# Patient Record
Sex: MALE | Race: WHITE | URBAN - METROPOLITAN AREA
[De-identification: names, ages, dates, MRNs, and addresses within clinical notes are randomized per-mention and may not be internally consistent; named-entity substitution may affect disease eponyms.]

---

## 2019-05-29 ENCOUNTER — TELEPHONE (OUTPATIENT)
Dept: TRANSPLANT | Facility: CLINIC | Age: 49
End: 2019-05-29

## 2019-05-29 DIAGNOSIS — Z00.5 TRANSPLANT DONOR EVALUATION: Primary | ICD-10-CM

## 2019-05-29 NOTE — TELEPHONE ENCOUNTER
"[facility] BREEZE  [id number]      LIVING KIDNEY DONOR EVALUATION  Donor First Name Marcus Donor MRN    Donor Last Name Thuy Completed 2019 7:19 PM    1970 Record ID [id number]   BREEZE Screen PASSED     Intended Recipient  Recipient First Name Robbin Recipient MRN    Recipient Last Name Luis Daniel Relationship Close Friend   Recipient   Recipient Diagnosis    Recipient's ABO      Donor Information  Age 48 Gender Male   Ht 170 cm (5' 7'') Race    Wt 90.7 kg (200 lbs) Ethnicity Not /   BMI 31.30 kg/m  Preferred Language English      Required No     Blood Type Unknown   Demographics  Home Address [address] [place] [address] # [phone]   City [place] Type Mobile   State [place] Alternate #    [facility] Code [id number] Type    Country [place] Preferred Contact day Mon, Fri, Thur, Wed, Tue   Email [email or web address] Preferred Contact time 11:00 AM-1:00 PM, 1:00 PM-4:00 PM, 09:00 AM-11:00 AM   &&   Donor's Medical Information  Medical History None Reported Medications None Reported   Surgical History Fundoplication for GERD and/or Hiatal Hernia   Surgery, Right Arm, NOS   Surgery, Right Shoulder, NOS Allergies NKDA   Social History EtOH: Occasional (1-2 drinks/week)   Illicit Drug Use: Denies   Tobacco: Denies Self-Reported Functional Status \"I am able to participate in strenuous sports such as swimming, singles tennis, football, basketball, or skiing\"   Family Medical History Cancer (Grandparent)   Diabetes (Father, Grandparent)   Heart Disease (denies)   Hypertension (denies)   Kidney Disease (denies)   Kidney Stones (denies) Exercise Frequency Exercise (>3 times per week)   Review of Organ Systems  Review of Systems Airway or Lungs: No   Blood Disorder: No   Cancer: No   Diabetes,Thyroid,Adrenal,Endocrine Disorder: No   Digestive or Liver: No   Heart or Circulatory System: No   Immune Diseases: No   Kidneys and Bladder: No   Male Health: No   Muscles,Bones,Joints: No   Neuro: " No   Psych: No   &&   Donor's Social Information  Marital Status  Living Accommodation Owns own home/apartment   Level of Education Graduate or professional degree complete Living Arrangement With spouse   Employment Status Full Time Concerns: health and life insurance No   Employer [employer] Concerns: job security and lost income No   Occupation      Medical Insurance Status Has medical insurance     High Risk Behavior  High Risk Behaviors Blood transfusion < 12 months. (NO)   Commercial sex < 12 months. (NO)   Illicit IV drug use < 5yrs. (NO)   Male:male sexual contact < 5yrs. (NO)   Other high risk sexual contact < 12 months. (NO)   Reason for Donation  Referral Tx Candidate Reason for Donation Robbin is a close friend. He is a father type figure to me and I m happy to try to help him.   Permission to Disclose Inquiry Yes Patient Comments    Donor Motivation Level Highly motivated donor     PCP Contact  PCP Name Dr. Zulema Mcgraw   PCP [facility]   PCP [facility]   PCP Phone [phone]   Emergency Contact  First Name [place] First Name Taiwo   Last Name Thuy Last Name Thuy   Phone # [phone] Phone # [phone]   Phone Type Mobile Phone Type Mobile   Relationship Spouse Relationship Child   Office Use  Reviewed By    Reviewed 5/29/2019 1:03 PM   Admin Folder Archive   Comments    Lost for Followup    Extended Comments    BREEZE ID fairjo [email or web address]:[id number] survey status completed   Activity History  Call  Task    Due Date 5/29/2019   Last Modified Date/Time 5/29/2019 11:32 AM   Comments    Call  Task    Due Date 5/29/2019   Last Modified Date/Time 5/29/2019 11:22 AM   Comments